# Patient Record
Sex: MALE | Race: WHITE
[De-identification: names, ages, dates, MRNs, and addresses within clinical notes are randomized per-mention and may not be internally consistent; named-entity substitution may affect disease eponyms.]

---

## 2018-02-09 ENCOUNTER — HOSPITAL ENCOUNTER (EMERGENCY)
Dept: HOSPITAL 96 - M.ERS | Age: 48
Discharge: HOME | End: 2018-02-09
Payer: COMMERCIAL

## 2018-02-09 VITALS — BODY MASS INDEX: 25.9 KG/M2 | WEIGHT: 164.99 LBS | HEIGHT: 67 IN

## 2018-02-09 VITALS — SYSTOLIC BLOOD PRESSURE: 135 MMHG | DIASTOLIC BLOOD PRESSURE: 80 MMHG

## 2018-02-09 DIAGNOSIS — R51: Primary | ICD-10-CM

## 2018-02-09 DIAGNOSIS — I10: ICD-10-CM

## 2018-02-09 DIAGNOSIS — M19.90: ICD-10-CM

## 2018-02-09 DIAGNOSIS — J45.909: ICD-10-CM

## 2018-02-09 DIAGNOSIS — F17.210: ICD-10-CM

## 2019-10-05 ENCOUNTER — HOSPITAL ENCOUNTER (EMERGENCY)
Dept: HOSPITAL 96 - M.ERS | Age: 49
Discharge: HOME | End: 2019-10-05
Payer: COMMERCIAL

## 2019-10-05 VITALS — DIASTOLIC BLOOD PRESSURE: 84 MMHG | SYSTOLIC BLOOD PRESSURE: 135 MMHG

## 2019-10-05 VITALS — BODY MASS INDEX: 26.68 KG/M2 | WEIGHT: 170 LBS | HEIGHT: 67 IN

## 2019-10-05 DIAGNOSIS — I10: ICD-10-CM

## 2019-10-05 DIAGNOSIS — F17.210: ICD-10-CM

## 2019-10-05 DIAGNOSIS — J40: Primary | ICD-10-CM

## 2019-10-05 DIAGNOSIS — M19.90: ICD-10-CM

## 2019-10-05 LAB
ABSOLUTE BASOPHILS: 0.1 THOU/UL (ref 0–0.2)
ABSOLUTE EOSINOPHILS: 0.3 THOU/UL (ref 0–0.7)
ABSOLUTE MONOCYTES: 0.5 THOU/UL (ref 0–1.2)
ALBUMIN SERPL-MCNC: 3.3 G/DL (ref 3.4–5)
ALP SERPL-CCNC: 86 U/L (ref 46–116)
ALT SERPL-CCNC: 21 U/L (ref 30–65)
ANION GAP SERPL CALC-SCNC: 8 MMOL/L (ref 7–16)
AST SERPL-CCNC: 11 U/L (ref 15–37)
BASOPHILS NFR BLD AUTO: 1.3 %
BILIRUB SERPL-MCNC: 0.3 MG/DL
BUN SERPL-MCNC: 16 MG/DL (ref 7–18)
CALCIUM SERPL-MCNC: 8.3 MG/DL (ref 8.5–10.1)
CHLORIDE SERPL-SCNC: 103 MMOL/L (ref 98–107)
CO2 SERPL-SCNC: 29 MMOL/L (ref 21–32)
CREAT SERPL-MCNC: 1.3 MG/DL (ref 0.6–1.3)
EOSINOPHIL NFR BLD: 4.5 %
GLUCOSE SERPL-MCNC: 103 MG/DL (ref 70–99)
GRANULOCYTES NFR BLD MANUAL: 60.5 %
HCT VFR BLD CALC: 40.8 % (ref 42–52)
HGB BLD-MCNC: 14.1 GM/DL (ref 14–18)
LYMPHOCYTES # BLD: 1.9 THOU/UL (ref 0.8–5.3)
LYMPHOCYTES NFR BLD AUTO: 26.4 %
MCH RBC QN AUTO: 31.7 PG (ref 26–34)
MCHC RBC AUTO-ENTMCNC: 34.4 G/DL (ref 28–37)
MCV RBC: 92 FL (ref 80–100)
MONOCYTES NFR BLD: 7.3 %
MPV: 8.7 FL. (ref 7.2–11.1)
NEUTROPHILS # BLD: 4.3 THOU/UL (ref 1.6–8.1)
NUCLEATED RBCS: 0 /100WBC
PLATELET COUNT*: 204 THOU/UL (ref 150–400)
POTASSIUM SERPL-SCNC: 3.5 MMOL/L (ref 3.5–5.1)
PROT SERPL-MCNC: 6.7 G/DL (ref 6.4–8.2)
RBC # BLD AUTO: 4.44 MIL/UL (ref 4.5–6)
RDW-CV: 13.5 % (ref 10.5–14.5)
SODIUM SERPL-SCNC: 140 MMOL/L (ref 136–145)
TROPONIN-I LEVEL: <0.06 NG/ML (ref ?–0.06)
WBC # BLD AUTO: 7 THOU/UL (ref 4–11)

## 2019-10-06 NOTE — E[place] [address]
Phone:  [phone]                     ELECTROCARDIOGRAM REPORT      
_______________________________________________________________________________
 
Name:       HEMAVLADIMIR              Room:                      [facility]#:  [id number]      Account #:      [id number]  
Admission:  10/05/19     Attend Phys:                         
Discharge:  10/05/19     Date of Birth:  [birth year]  
         Report #: [id number]
    [id number]
_______________________________________________________________________________
THIS REPORT FOR:  //name//                      
 
                         [facility] ED
                                       
Test Date:    2019-10-05               Test Time:    17:08:49
Pat Name:     VLADIMIR GARRIDO              Department:   
Patient ID:   [id number]            Room:          
Gender:       M                        Technician:   
:          1970               Requested By: Ankit Heller
Order Number: [id number] MD:   Marvin Oneil
                                 Measurements
Intervals                              Axis          
Rate:         67                       P:            63
OK:           139                      QRS:          70
QRSD:         115                      T:            43
QT:           398                                    
QTc:          420                                    
                           Interpretive Statements
Sinus rhythm
Incomplete right bundle branch block
Compared to ECG 2016 21:39:29
Incomplete right bundle-branch block now present
ST (T wave) deviation no longer present
 
Electronically Signed On 10-6-2019 16:56:06 CDT by Marvin Oneil
[email or web address]
 
 
 
 
 
 
 
 
 
 
 
 
 
 
 
 
 
  <ELECTRONICALLY SIGNED>
                                           By: Marvin Oneil MD, [facility]   
  10/06/19     [id number]
D: 10/05/19 1708   _____________________________________
T: 10/05/19 1708   Marvin Oneil MD, FAC     /EPI